# Patient Record
Sex: MALE | Race: AMERICAN INDIAN OR ALASKA NATIVE | NOT HISPANIC OR LATINO | Employment: UNEMPLOYED | URBAN - NONMETROPOLITAN AREA
[De-identification: names, ages, dates, MRNs, and addresses within clinical notes are randomized per-mention and may not be internally consistent; named-entity substitution may affect disease eponyms.]

---

## 2024-09-18 ENCOUNTER — HOSPITAL ENCOUNTER (EMERGENCY)
Facility: OTHER | Age: 39
Discharge: HOME | End: 2024-09-18
Attending: STUDENT IN AN ORGANIZED HEALTH CARE EDUCATION/TRAINING PROGRAM | Admitting: STUDENT IN AN ORGANIZED HEALTH CARE EDUCATION/TRAINING PROGRAM
Payer: COMMERCIAL

## 2024-09-18 ENCOUNTER — APPOINTMENT (OUTPATIENT)
Dept: GENERAL RADIOLOGY | Facility: OTHER | Age: 39
End: 2024-09-18
Attending: STUDENT IN AN ORGANIZED HEALTH CARE EDUCATION/TRAINING PROGRAM
Payer: COMMERCIAL

## 2024-09-18 VITALS
DIASTOLIC BLOOD PRESSURE: 82 MMHG | OXYGEN SATURATION: 99 % | BODY MASS INDEX: 27.92 KG/M2 | RESPIRATION RATE: 16 BRPM | SYSTOLIC BLOOD PRESSURE: 124 MMHG | TEMPERATURE: 98.2 F | HEART RATE: 85 BPM | WEIGHT: 195 LBS | HEIGHT: 70 IN

## 2024-09-18 DIAGNOSIS — L03.116 CELLULITIS OF LEFT ANKLE: ICD-10-CM

## 2024-09-18 DIAGNOSIS — M25.572 ACUTE LEFT ANKLE PAIN: ICD-10-CM

## 2024-09-18 PROCEDURE — 99283 EMERGENCY DEPT VISIT LOW MDM: CPT | Performed by: STUDENT IN AN ORGANIZED HEALTH CARE EDUCATION/TRAINING PROGRAM

## 2024-09-18 PROCEDURE — 96372 THER/PROPH/DIAG INJ SC/IM: CPT | Performed by: STUDENT IN AN ORGANIZED HEALTH CARE EDUCATION/TRAINING PROGRAM

## 2024-09-18 PROCEDURE — 99284 EMERGENCY DEPT VISIT MOD MDM: CPT | Performed by: STUDENT IN AN ORGANIZED HEALTH CARE EDUCATION/TRAINING PROGRAM

## 2024-09-18 PROCEDURE — 250N000011 HC RX IP 250 OP 636: Performed by: STUDENT IN AN ORGANIZED HEALTH CARE EDUCATION/TRAINING PROGRAM

## 2024-09-18 PROCEDURE — 73610 X-RAY EXAM OF ANKLE: CPT | Mod: LT

## 2024-09-18 RX ORDER — CEFTRIAXONE SODIUM 1 G
1 VIAL (EA) INJECTION ONCE
Status: COMPLETED | OUTPATIENT
Start: 2024-09-18 | End: 2024-09-18

## 2024-09-18 RX ORDER — KETOROLAC TROMETHAMINE 30 MG/ML
30 INJECTION, SOLUTION INTRAMUSCULAR; INTRAVENOUS ONCE
Status: COMPLETED | OUTPATIENT
Start: 2024-09-18 | End: 2024-09-18

## 2024-09-18 RX ORDER — CEPHALEXIN 500 MG/1
500 CAPSULE ORAL 4 TIMES DAILY
Qty: 20 CAPSULE | Refills: 0 | Status: SHIPPED | OUTPATIENT
Start: 2024-09-18 | End: 2024-09-23

## 2024-09-18 RX ADMIN — CEFTRIAXONE SODIUM 1 G: 1 INJECTION, POWDER, FOR SOLUTION INTRAMUSCULAR; INTRAVENOUS at 15:31

## 2024-09-18 RX ADMIN — KETOROLAC TROMETHAMINE 30 MG: 30 INJECTION, SOLUTION INTRAMUSCULAR at 15:31

## 2024-09-18 ASSESSMENT — COLUMBIA-SUICIDE SEVERITY RATING SCALE - C-SSRS
6. HAVE YOU EVER DONE ANYTHING, STARTED TO DO ANYTHING, OR PREPARED TO DO ANYTHING TO END YOUR LIFE?: NO
2. HAVE YOU ACTUALLY HAD ANY THOUGHTS OF KILLING YOURSELF IN THE PAST MONTH?: NO
1. IN THE PAST MONTH, HAVE YOU WISHED YOU WERE DEAD OR WISHED YOU COULD GO TO SLEEP AND NOT WAKE UP?: NO

## 2024-09-18 NOTE — ED TRIAGE NOTES
Pt comes in to the ER by law enforcement. He was running through the woods about 4 days ago. Rolled his L ankle going from a hard surface to a liquid surface. Pain is an 8/10, has not had ibuprofen or tylenol today. Ankle is swollen.     Triage Assessment (Adult)       Row Name 09/18/24 1510          Triage Assessment    Airway WDL WDL        Respiratory WDL    Respiratory WDL WDL        Skin Circulation/Temperature WDL    Skin Circulation/Temperature WDL X        Cardiac WDL    Cardiac WDL WDL        Peripheral/Neurovascular WDL    Peripheral Neurovascular WDL WDL        Cognitive/Neuro/Behavioral WDL    Cognitive/Neuro/Behavioral WDL WDL

## 2024-09-18 NOTE — ED PROVIDER NOTES
"  History     Chief Complaint   Patient presents with    Ankle Pain       Chan Souza is a 39 year old male who presents with left ankle pain. Pain started 4 days ago after following his ankle and possibly sustaining an abrasion. Reports fevers since then. 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 Pain located bilateral ankle and feels like anterior medial ankle skin has increased warmth. He has been using ibuprofen for pain. Able to ambulate but with difficulty.    No Known Allergies    There are no problems to display for this patient.      History reviewed. No pertinent past medical history.    History reviewed. No pertinent surgical history.    History reviewed. No pertinent family history.    Social History     Tobacco Use    Smoking status: Every Day     Current packs/day: 1.00     Average packs/day: 1 pack/day for 10.7 years (10.7 ttl pk-yrs)     Types: Cigarettes     Start date: 2014    Smokeless tobacco: Never   Substance Use Topics    Alcohol use: Not Currently    Drug use: Yes     Types: Methamphetamines       Medications:    cephALEXin (KEFLEX) 500 MG capsule      Review of Systems: See HPI for pertinent negatives and positives. All other pertinent systems reviewed and found to be negative.    Physical Exam   /82   Pulse 85   Temp 98.2  F (36.8  C) (Tympanic)   Resp 16   Ht 1.778 m (5' 10\")   Wt 88.5 kg (195 lb)   SpO2 99%   BMI 27.98 kg/m       General: awake, comfortable  HEENT: atraumatic  Respiratory: normal effort  Cardiovascular: left dorsalis pedis and tibialis posterior pulses 2+  Extremities: left ankle with bilateral malleolar tenderness, ROM and strength normal. No fifth metatarsal tenderness, navicular tenderness, medial or lateral malleolar tenderness.   Skin: increased warmth and erythema over anterior medial ankle, scaling of some surrounding skin, dry, skin intact, no fluctuance or crepitus  Neuro: alert, sensation intact throughout " aforementioned foot and ankle  Psych: appropriate mood and affect    ED Course           Results for orders placed or performed during the hospital encounter of 09/18/24 (from the past 24 hour(s))   XR Ankle Port Left G/E 3 Views    Narrative    Exam: XR ANKLE PORT LEFT G/E 3 VIEWS    Technique: Left ankle, 3 Views    Comparison: None    Exam reason: bilateral ankle pain after rolling it 4 days ago    Findings:  No acute fracture or dislocation. Joint spaces are well maintained.   The ankle mortise is intact.    Soft tissue swelling about the ankle.      Impression    Impression:  No acute fracture or dislocation.    AALIYAH BRITT MD         SYSTEM ID:  [id number]       Medications   cefTRIAXone (ROCEPHIN) in NS for IM administration 1 g (1 g Intramuscular $Given 9/18/24 1531)   ketorolac (TORADOL) injection 30 mg (30 mg Intramuscular $Given 9/18/24 1531)       Assessments & Plan (with Medical Decision Making)     I have reviewed nursing notes.    39 year old male evaluated for left ankle pain and redness and increased warmth. Ankle XR with acute bony abnormality. This is soft tissue swelling which could represent ankle sprain. There is an area of skin that appears most consistent with cellulitis over anterior ankle and appears to be associated with recent skin break area. Treating with ankle brace and antibiotics. Discharged back to [facility] with attached instructions on diagnoses provided including ED return precautions.     I have reviewed the findings, diagnosis, plan and need for any follow up with the patient.    Patient instructions:   No broken bone was seen on x-ray. Suspect you sprained your ankle. Use ankle brace for comfort as needed. Perform range of motion exercises daily by writing the ABC's with the big toe 3-4x's per day until healed. You can do activities as tolerated and avoid activity that causes pain. If not improving significantly after 1-2 weeks please follow up with primary care provider,  orthopedics, or ER. On rare occasions there can be a fracture (bone break) that is not seen on initial x-ray or a ligament/tendon tear that may require surgery. Return to ER if pain significantly worsens or you develop numbness or weakness in your foot.    You have an infection of your soft tissue/skin. This is called cellulitis. Please review attached instructions including reasons to return to the emergency department or PCP for re-evaluation. Please complete the entire course of antibiotic. Symptoms may worsen over the next 1-2 days, but if symptoms are not improving after taking this antibiotic for 48 hours - please see a primary care provider or return to the emergency department for evaluation.     Skin redness and swelling can take up to several weeks to fully resolve.    Please review attached instructions including other reasons to return to the emergency department.      New Prescriptions    CEPHALEXIN (KEFLEX) 500 MG CAPSULE    Take 1 capsule (500 mg) by mouth 4 times daily for 5 days.       Final diagnoses:   Acute left ankle pain   Cellulitis of left ankle       9/18/2024   [facility]       Huang Lockett MD  09/18/24 7731

## 2024-09-18 NOTE — DISCHARGE INSTRUCTIONS
No broken bone was seen on x-ray. Suspect you sprained your ankle. Use ankle brace for comfort as needed. Perform range of motion exercises daily by writing the ABC's with the big toe 3-4x's per day until healed. You can do activities as tolerated and avoid activity that causes pain. If not improving significantly after 1-2 weeks please follow up with primary care provider, orthopedics, or ER. On rare occasions there can be a fracture (bone break) that is not seen on initial x-ray or a ligament/tendon tear that may require surgery. Return to ER if pain significantly worsens or you develop numbness or weakness in your foot.    You have an infection of your soft tissue/skin. This is called cellulitis. Please review attached instructions including reasons to return to the emergency department or PCP for re-evaluation. Please complete the entire course of antibiotic. Symptoms may worsen over the next 1-2 days, but if symptoms are not improving after taking this antibiotic for 48 hours - please see a primary care provider or return to the emergency department for evaluation.     Skin redness and swelling can take up to several weeks to fully resolve.    Please review attached instructions including other reasons to return to the emergency department.

## 2024-11-08 ENCOUNTER — OFFICE VISIT (OUTPATIENT)
Dept: FAMILY MEDICINE | Facility: OTHER | Age: 39
End: 2024-11-08
Attending: NURSE PRACTITIONER
Payer: MEDICAID

## 2024-11-08 VITALS
SYSTOLIC BLOOD PRESSURE: 132 MMHG | RESPIRATION RATE: 18 BRPM | DIASTOLIC BLOOD PRESSURE: 74 MMHG | WEIGHT: 214.8 LBS | HEART RATE: 79 BPM | OXYGEN SATURATION: 97 % | TEMPERATURE: 98.1 F | HEIGHT: 70 IN | BODY MASS INDEX: 30.75 KG/M2

## 2024-11-08 DIAGNOSIS — F32.A DEPRESSION, UNSPECIFIED DEPRESSION TYPE: ICD-10-CM

## 2024-11-08 DIAGNOSIS — Z11.3 SCREEN FOR STD (SEXUALLY TRANSMITTED DISEASE): ICD-10-CM

## 2024-11-08 DIAGNOSIS — G47.00 INSOMNIA, UNSPECIFIED TYPE: ICD-10-CM

## 2024-11-08 DIAGNOSIS — Z13.220 SCREENING FOR CHOLESTEROL LEVEL: ICD-10-CM

## 2024-11-08 DIAGNOSIS — F11.21 FENTANYL USE DISORDER, SEVERE, IN EARLY REMISSION (H): ICD-10-CM

## 2024-11-08 DIAGNOSIS — Z23 ENCOUNTER FOR IMMUNIZATION: ICD-10-CM

## 2024-11-08 DIAGNOSIS — Z00.00 ROUTINE GENERAL MEDICAL EXAMINATION AT A HEALTH CARE FACILITY: Primary | ICD-10-CM

## 2024-11-08 DIAGNOSIS — F15.21 METHAMPHETAMINE USE DISORDER, SEVERE, IN EARLY REMISSION (H): ICD-10-CM

## 2024-11-08 LAB
ALBUMIN SERPL BCG-MCNC: 4.6 G/DL (ref 3.5–5.2)
ALP SERPL-CCNC: 82 U/L (ref 40–150)
ALT SERPL W P-5'-P-CCNC: 42 U/L (ref 0–70)
ANION GAP SERPL CALCULATED.3IONS-SCNC: 10 MMOL/L (ref 7–15)
AST SERPL W P-5'-P-CCNC: 25 U/L (ref 0–45)
BASOPHILS # BLD AUTO: 0.1 10E3/UL (ref 0–0.2)
BASOPHILS NFR BLD AUTO: 1 %
BILIRUB SERPL-MCNC: 0.2 MG/DL
BUN SERPL-MCNC: 8.7 MG/DL (ref 6–20)
C TRACH DNA SPEC QL PROBE+SIG AMP: NEGATIVE
CALCIUM SERPL-MCNC: 9.6 MG/DL (ref 8.8–10.4)
CHLORIDE SERPL-SCNC: 104 MMOL/L (ref 98–107)
CHOLEST SERPL-MCNC: 180 MG/DL
CREAT SERPL-MCNC: 0.84 MG/DL (ref 0.67–1.17)
EGFRCR SERPLBLD CKD-EPI 2021: >90 ML/MIN/1.73M2
EOSINOPHIL # BLD AUTO: 0.2 10E3/UL (ref 0–0.7)
EOSINOPHIL NFR BLD AUTO: 2 %
ERYTHROCYTE [DISTWIDTH] IN BLOOD BY AUTOMATED COUNT: 13.8 % (ref 10–15)
FASTING STATUS PATIENT QL REPORTED: NO
FASTING STATUS PATIENT QL REPORTED: NO
GLUCOSE SERPL-MCNC: 104 MG/DL (ref 70–99)
HCO3 SERPL-SCNC: 31 MMOL/L (ref 22–29)
HCT VFR BLD AUTO: 46.3 % (ref 40–53)
HCV AB SERPL QL IA: NONREACTIVE
HDLC SERPL-MCNC: 47 MG/DL
HGB BLD-MCNC: 15 G/DL (ref 13.3–17.7)
HIV 1+2 AB+HIV1 P24 AG SERPL QL IA: NONREACTIVE
IMM GRANULOCYTES # BLD: 0 10E3/UL
IMM GRANULOCYTES NFR BLD: 0 %
LDLC SERPL CALC-MCNC: 104 MG/DL
LYMPHOCYTES # BLD AUTO: 1 10E3/UL (ref 0.8–5.3)
LYMPHOCYTES NFR BLD AUTO: 13 %
MCH RBC QN AUTO: 28.6 PG (ref 26.5–33)
MCHC RBC AUTO-ENTMCNC: 32.4 G/DL (ref 31.5–36.5)
MCV RBC AUTO: 88 FL (ref 78–100)
MONOCYTES # BLD AUTO: 0.4 10E3/UL (ref 0–1.3)
MONOCYTES NFR BLD AUTO: 5 %
N GONORRHOEA DNA SPEC QL NAA+PROBE: NEGATIVE
NEUTROPHILS # BLD AUTO: 6 10E3/UL (ref 1.6–8.3)
NEUTROPHILS NFR BLD AUTO: 78 %
NONHDLC SERPL-MCNC: 133 MG/DL
NRBC # BLD AUTO: 0 10E3/UL
NRBC BLD AUTO-RTO: 0 /100
PLATELET # BLD AUTO: 282 10E3/UL (ref 150–450)
POTASSIUM SERPL-SCNC: 4.4 MMOL/L (ref 3.4–5.3)
PROT SERPL-MCNC: 7.4 G/DL (ref 6.4–8.3)
RBC # BLD AUTO: 5.25 10E6/UL (ref 4.4–5.9)
SODIUM SERPL-SCNC: 145 MMOL/L (ref 135–145)
T PALLIDUM AB SER QL: NONREACTIVE
TRIGL SERPL-MCNC: 146 MG/DL
WBC # BLD AUTO: 7.7 10E3/UL (ref 4–11)

## 2024-11-08 PROCEDURE — 36415 COLL VENOUS BLD VENIPUNCTURE: CPT | Mod: ZL | Performed by: NURSE PRACTITIONER

## 2024-11-08 PROCEDURE — 87491 CHLMYD TRACH DNA AMP PROBE: CPT | Mod: ZL | Performed by: NURSE PRACTITIONER

## 2024-11-08 PROCEDURE — 80053 COMPREHEN METABOLIC PANEL: CPT | Mod: ZL | Performed by: NURSE PRACTITIONER

## 2024-11-08 PROCEDURE — G0463 HOSPITAL OUTPT CLINIC VISIT: HCPCS | Mod: 25

## 2024-11-08 PROCEDURE — 85004 AUTOMATED DIFF WBC COUNT: CPT | Mod: ZL | Performed by: NURSE PRACTITIONER

## 2024-11-08 PROCEDURE — 87389 HIV-1 AG W/HIV-1&-2 AB AG IA: CPT | Mod: ZL | Performed by: NURSE PRACTITIONER

## 2024-11-08 PROCEDURE — 90656 IIV3 VACC NO PRSV 0.5 ML IM: CPT

## 2024-11-08 PROCEDURE — 86803 HEPATITIS C AB TEST: CPT | Mod: ZL | Performed by: NURSE PRACTITIONER

## 2024-11-08 PROCEDURE — 86780 TREPONEMA PALLIDUM: CPT | Mod: ZL | Performed by: NURSE PRACTITIONER

## 2024-11-08 PROCEDURE — 80061 LIPID PANEL: CPT | Mod: ZL | Performed by: NURSE PRACTITIONER

## 2024-11-08 PROCEDURE — 90677 PCV20 VACCINE IM: CPT

## 2024-11-08 RX ORDER — ACETAMINOPHEN 500 MG
1000 TABLET ORAL 4 TIMES DAILY PRN
COMMUNITY
Start: 2024-03-20

## 2024-11-08 RX ORDER — TRAZODONE HYDROCHLORIDE 50 MG/1
100 TABLET, FILM COATED ORAL AT BEDTIME
Qty: 180 TABLET | Refills: 4 | Status: SHIPPED | OUTPATIENT
Start: 2024-11-08

## 2024-11-08 RX ORDER — IBUPROFEN 400 MG/1
400 TABLET, FILM COATED ORAL 4 TIMES DAILY PRN
COMMUNITY
Start: 2024-11-06

## 2024-11-08 RX ORDER — BUPROPION HYDROCHLORIDE 150 MG/1
150 TABLET ORAL EVERY MORNING
Qty: 90 TABLET | Refills: 4 | Status: SHIPPED | OUTPATIENT
Start: 2024-11-08

## 2024-11-08 SDOH — HEALTH STABILITY: PHYSICAL HEALTH: ON AVERAGE, HOW MANY MINUTES DO YOU ENGAGE IN EXERCISE AT THIS LEVEL?: 30 MIN

## 2024-11-08 SDOH — HEALTH STABILITY: PHYSICAL HEALTH: ON AVERAGE, HOW MANY DAYS PER WEEK DO YOU ENGAGE IN MODERATE TO STRENUOUS EXERCISE (LIKE A BRISK WALK)?: 5 DAYS

## 2024-11-08 ASSESSMENT — ANXIETY QUESTIONNAIRES
8. IF YOU CHECKED OFF ANY PROBLEMS, HOW DIFFICULT HAVE THESE MADE IT FOR YOU TO DO YOUR WORK, TAKE CARE OF THINGS AT HOME, OR GET ALONG WITH OTHER PEOPLE?: NOT DIFFICULT AT ALL
GAD7 TOTAL SCORE: 0
1. FEELING NERVOUS, ANXIOUS, OR ON EDGE: NOT AT ALL
5. BEING SO RESTLESS THAT IT IS HARD TO SIT STILL: NOT AT ALL
GAD7 TOTAL SCORE: 0
GAD7 TOTAL SCORE: 0
4. TROUBLE RELAXING: NOT AT ALL
3. WORRYING TOO MUCH ABOUT DIFFERENT THINGS: NOT AT ALL
2. NOT BEING ABLE TO STOP OR CONTROL WORRYING: NOT AT ALL
7. FEELING AFRAID AS IF SOMETHING AWFUL MIGHT HAPPEN: NOT AT ALL
7. FEELING AFRAID AS IF SOMETHING AWFUL MIGHT HAPPEN: NOT AT ALL
IF YOU CHECKED OFF ANY PROBLEMS ON THIS QUESTIONNAIRE, HOW DIFFICULT HAVE THESE PROBLEMS MADE IT FOR YOU TO DO YOUR WORK, TAKE CARE OF THINGS AT HOME, OR GET ALONG WITH OTHER PEOPLE: NOT DIFFICULT AT ALL
6. BECOMING EASILY ANNOYED OR IRRITABLE: NOT AT ALL

## 2024-11-08 ASSESSMENT — SOCIAL DETERMINANTS OF HEALTH (SDOH): HOW OFTEN DO YOU GET TOGETHER WITH FRIENDS OR RELATIVES?: MORE THAN THREE TIMES A WEEK

## 2024-11-08 ASSESSMENT — PAIN SCALES - GENERAL: PAINLEVEL_OUTOF10: NO PAIN (0)

## 2024-11-08 NOTE — NURSING NOTE
"Chief Complaint   Patient presents with    Physical     For [facility]        Initial /74   Pulse 79   Temp 98.1  F (36.7  C) (Tympanic)   Resp 18   Ht 1.778 m (5' 10\")   Wt 97.4 kg (214 lb 12.8 oz)   SpO2 97%   BMI 30.82 kg/m   Estimated body mass index is 30.82 kg/m  as calculated from the following:    Height as of this encounter: 1.778 m (5' 10\").    Weight as of this encounter: 97.4 kg (214 lb 12.8 oz).  Medication Review: complete    The next two questions are to help us understand your food security.  If you are feeling you need any assistance in this area, we have resources available to support you today.          11/8/2024   SDOH- Food Insecurity   Within the past 12 months, did you worry that your food would run out before you got money to buy more? N    Within the past 12 months, did the food you bought just not last and you didn t have money to get more? N        Patient-reported         Health Care Directive:  Patient does not have a Health Care Directive: Discussed advance care planning with patient; however, patient declined at this time.    Soraya Tejada CMA    Immunization Documentation    Prior to Immunization administration, verified patients identity using patient's name and date of birth. Please see IMMUNIZATIONS  and order for additional information.  Patient / Parent instructed to remain in clinic for 15 minutes and report any adverse reaction to staff immediately.    Was the entire amount of vaccines given used? Yes    Soraya Tejada CMA  11/8/2024   11:23 AM        "

## 2024-11-08 NOTE — PATIENT INSTRUCTIONS
I recommend establishing care in [place] (for your convenience purposes) I recommend Carri Taylor NP as I am quite sure she does suboxone therapy although I cannot guarantee that this is something she will prescribe when you meet her or not. I have placed orders for wellbutrin and trazodone to resume these medications; I have sent a 1 year supply to pharmacy but we require annual physicals to get these medications prescribed regularly.     Updated flu and pneumonia shots today. You declined covid shot.     Labs in process including std screening. Will be in touch with results.     Patient Education   Preventive Care Advice   This is general advice given by our system to help you stay healthy. However, your care team may have specific advice just for you. Please talk to your care team about your preventive care needs.  Nutrition  Eat 5 or more servings of fruits and vegetables each day.  Try wheat bread, brown rice and whole grain pasta (instead of white bread, rice, and pasta).  Get enough calcium and vitamin D. Check the label on foods and aim for 100% of the RDA (recommended daily allowance).  Lifestyle  Exercise at least 150 minutes each week  (30 minutes a day, 5 days a week).  Do muscle strengthening activities 2 days a week. These help control your weight and prevent disease.  No smoking.  Wear sunscreen to prevent skin cancer.  Have a dental exam and cleaning every 6 months.  Yearly exams  See your health care team every year to talk about:  Any changes in your health.  Any medicines your care team has prescribed.  Preventive care, family planning, and ways to prevent chronic diseases.  Shots (vaccines)   HPV shots (up to age 26), if you've never had them before.  Hepatitis B shots (up to age 59), if you've never had them before.  COVID-19 shot: Get this shot when it's due.  Flu shot: Get a flu shot every year.  Tetanus shot: Get a tetanus shot every 10 years.  Pneumococcal, hepatitis A, and RSV  shots: Ask your care team if you need these based on your risk.  Shingles shot (for age 50 and up)  General health tests  Diabetes screening:  Starting at age 35, Get screened for diabetes at least every 3 years.  If you are younger than age 35, ask your care team if you should be screened for diabetes.  Cholesterol test: At age 39, start having a cholesterol test every 5 years, or more often if advised.  Bone density scan (DEXA): At age 50, ask your care team if you should have this scan for osteoporosis (brittle bones).  Hepatitis C: Get tested at least once in your life.  STIs (sexually transmitted infections)  Before age 24: Ask your care team if you should be screened for STIs.  After age 24: Get screened for STIs if you're at risk. You are at risk for STIs (including HIV) if:  You are sexually active with more than one person.  You don't use condoms every time.  You or a partner was diagnosed with a sexually transmitted infection.  If you are at risk for HIV, ask about PrEP medicine to prevent HIV.  Get tested for HIV at least once in your life, whether you are at risk for HIV or not.  Cancer screening tests  Cervical cancer screening: If you have a cervix, begin getting regular cervical cancer screening tests starting at age 21.  Breast cancer scan (mammogram): If you've ever had breasts, begin having regular mammograms starting at age 40. This is a scan to check for breast cancer.  Colon cancer screening: It is important to start screening for colon cancer at age 45.  Have a colonoscopy test every 10 years (or more often if you're at risk) Or, ask your provider about stool tests like a FIT test every year or Cologuard test every 3 years.  To learn more about your testing options, visit:   .  For help making a decision, visit:   https://bit.ly/hi57650.  Prostate cancer screening test: If you have a prostate, ask your care team if a prostate cancer screening test (PSA) at age 55 is right for you.  Lung cancer  screening: If you are a current or former smoker ages 50 to 80, ask your care team if ongoing lung cancer screenings are right for you.  For informational purposes only. Not to replace the advice of your health care provider. Copyright   2023 [facility]. All rights reserved. Clinically reviewed by the [facility] Transitions Program. [employer] [id number] - REV 01/24.

## 2024-11-08 NOTE — PROGRESS NOTES
Preventive Care Visit  [facility]  Soraya Freeman NP, Family Medicine  Nov 8, 2024      Assessment & Plan   Problem List Items Addressed This Visit    None  Visit Diagnoses       Routine general medical examination at a health care facility    -  Primary    Relevant Orders    CBC and Differential (Completed)    Comprehensive Metabolic Panel (Completed)    CBC and Differential    Screen for STD (sexually transmitted disease)        Relevant Orders    GC/Chlamydia by PCR (Completed)    HIV Antigen Antibody Combo (Completed)    Hepatitis C Screen Reflex to HCV RNA Quant and Genotype (Completed)    Treponema Ab w Reflex to RPR and Titer (Completed)    Screening for cholesterol level        Relevant Orders    Lipid Panel (Completed)    Encounter for immunization        Relevant Orders    PNEUMOCOCCAL 20 VALENT CONJUGATE (PREVNAR 20) (Completed)    INFLUENZA VACCINE, SPLIT VIRUS, TRIVALENT,PF (FLUZONE\FLUARIX) (Completed)    Insomnia, unspecified type        Relevant Medications    traZODone (DESYREL) 50 MG tablet    Depression, unspecified depression type        Relevant Medications    buPROPion (WELLBUTRIN XL) 150 MG 24 hr tablet    traZODone (DESYREL) 50 MG tablet    Methamphetamine use disorder, severe, in early remission (H)        Fentanyl use disorder, severe, in early remission (H)                 Patient has been advised of split billing requirements and indicates understanding: Yes    1. Routine general medical examination at a health care facility (Primary)  - CBC and Differential  - Comprehensive Metabolic Panel  Normal blood counts, renal function, electrolytes     2. Screen for STD (sexually transmitted disease)  - GC/Chlamydia by PCR  - HIV Antigen Antibody Combo  - Hepatitis C Screen Reflex to HCV RNA Quant and Genotype  - Treponema Ab w Reflex to RPR and Titer  Negative results for the above; no indication for treatment. Recommend safe sex practices.     3. Screening for  cholesterol level  - Lipid Panel  Mild elevation of LDL; total cholesterol within normal limits. Recommend diet/lifestyle changes.     4. Encounter for immunization  - PNEUMOCOCCAL 20 VALENT CONJUGATE (PREVNAR 20)  - INFLUENZA VACCINE, SPLIT VIRUS, TRIVALENT,PF (FLUZONE\FLUARIX)    5. Insomnia, unspecified type  - traZODone (DESYREL) 50 MG tablet; Take 2 tablets (100 mg) by mouth at bedtime.  Dispense: 180 tablet; Refill: 4  I was unable to find this on record but patient stated he has done well on it in the past, this summer when he was at treatment in [place], [place]. I feel it is appropriate to prescribe, I recommend annual physicals with a PCP to get additional refills beyond 1 year.     6. Depression, unspecified depression type  - buPROPion (WELLBUTRIN XL) 150 MG 24 hr tablet; Take 1 tablet (150 mg) by mouth every morning.  Dispense: 90 tablet; Refill: 4  In records; he has been historically prescribed and done well. Will fill for 1 year and recommend establish care with PCP, likely in [place] at [facility] as he resides in [place] and states this would be more convenient.     7. Methamphetamine use disorder, severe, in early remission (H)  8. Fentanyl use disorder, severe, in early remission (H)  Currently in treatment; long standing history of intermittent issues with methamphetamines. Has been treated with suboxone in the past; and he would like to get back on this. I do not prescribe it but patient willing to establish care with provider who does at [facility] to discuss potential for this.        Nicotine/Tobacco Cessation  He reports that he has been smoking cigarettes. He started smoking about 10 years ago. He has a 10.9 pack-year smoking history. He has never used smokeless tobacco.  Nicotine/Tobacco Cessation Plan  Information offered: Patient not interested at this time      BMI  Estimated body mass index is 30.82 kg/m  as calculated from the following:    Height as of this  "encounter: 1.778 m (5' 10\").    Weight as of this encounter: 97.4 kg (214 lb 12.8 oz).   Weight management plan: Discussed healthy diet and exercise guidelines    Counseling  Appropriate preventive services were addressed with this patient via screening, questionnaire, or discussion as appropriate for fall prevention, nutrition, physical activity, Tobacco-use cessation, social engagement, weight loss and cognition.  Checklist reviewing preventive services available has been given to the patient.  Reviewed patient's diet, addressing concerns and/or questions.   The patient was instructed to see the dentist every 6 months.   He is at risk for psychosocial distress and has been provided with information to reduce risk.         Return in about 53 weeks (around 11/14/2025) for Annual Wellness Visit.      Dinh Giles is a 39 year old, presenting for the following:  Physical (For [facility] )        11/8/2024    10:23 AM   Additional Questions   Roomed by FREDERICK Lira   Accompanied by Self         11/8/2024   Forms   Any forms needing to be completed Yes             DORIS Giles presents here today for a physical as advised by [facility] where he is currently staying.  He is there for treatment for methamphetamine abuse and had also tried fentanyl in the past.  He is there somewhat willingly but also it was court ordered.  He tells me that he has a home with his girlfriend and their children in [place].  He plans to go back there following this stay and treatment.  He reports that he has historically done well on Suboxone treatment and he was hoping he could get back on it.  He has been off of it for a month or 2.  He tells me he thinks he stopped taking it cold turkey around September 5.  At that time he also stopped taking his Wellbutrin and his trazodone which he had previously been prescribed.  And that is when he began using street drugs again.  However, it sounds as though he has been intermittently " "using over the years and there is some discrepencies as to the time periods.  He had done a different stent of treatment in [place], [place] around July 2024.  He tells me that he has had some relationship problems and at one point his girlfriend had \"taken his phone and sent herself text messages which she then sent via [employer] to his Suboxone prescriber.\" Chan states he was not using drugs at that time, but the staged text messages made it sound as though he was using and selling his prescriptions.  Due to this, there were issues for him getting his medications filled and then he started using again.  He is working through that with his significant other.  He denies any physical health concerns at this time.     He has never had a colonoscopy.  He denies any abdominal pain.  He is not ready to quit smoking.  He denies having a COVID shot today but is okay with receiving a flu and pneumonia shot.  He is also hopeful he can get back on Wellbutrin and trazodone.  He tells me that his mood has been down and depressed. No suicidal ideations or attempts.  He has a lot of insomnia issues and trazodone was helpful in the past.  This was prescribed in the summer when he was at treatment in [place], [place].  He is from the [facility] and generally doctors with [facility].  He was previously being prescribed Suboxone by a provider at [facility] clinic in [place], [place].  He tells me be much more convenient for him to  In [place], [place].      Health Care Directive  Patient does not have a Health Care Directive: Discussed advance care planning with patient; however, patient declined at this time.      11/8/2024   General Health   How would you rate your overall physical health? Good   Feel stress (tense, anxious, or unable to sleep) Only a little      (!) STRESS CONCERN      11/8/2024   Nutrition   Three or more servings of calcium each day? Yes   Diet: Regular (no restrictions)   How many " servings of fruit and vegetables per day? (!) 0-1   How many sweetened beverages each day? (!) 3            11/8/2024   Exercise   Days per week of moderate/strenous exercise 5 days   Average minutes spent exercising at this level 30 min            11/8/2024   Social Factors   Frequency of gathering with friends or relatives More than three times a week   Worry food won't last until get money to buy more No   Food not last or not have enough money for food? No   Do you have housing? (Housing is defined as stable permanent housing and does not include staying ouside in a car, in a tent, in an abandoned building, in an overnight shelter, or couch-surfing.) Yes   Are you worried about losing your housing? No   Lack of transportation? No   Unable to get utilities (heat,electricity)? No            11/8/2024   Dental   Dentist two times every year? (!) NO            11/8/2024   TB Screening   Were you born outside of the [place]? No            Today's PHQ-2 Score:       11/8/2024    10:10 AM   PHQ-2 ( 1999 [employer])   Q1: Little interest or pleasure in doing things 0    Q2: Feeling down, depressed or hopeless 0    PHQ-2 Score 0    Q1: Little interest or pleasure in doing things Not at all   Q2: Feeling down, depressed or hopeless Not at all   PHQ-2 Score 0       Patient-reported           11/8/2024   Substance Use   If I could quit smoking, I would Somewhat agree   I want to quit somking, worry about health affects Somewhat agree   Willing to make a plan to quit smoking Somewhat agree   Willing to cut down before quitting Somewhat agree   Alcohol more than 3/day or more than 7/wk Not Applicable   Do you use any other substances recreationally? No        Social History     Tobacco Use    Smoking status: Every Day     Current packs/day: 1.00     Average packs/day: 1 pack/day for 10.9 years (10.9 ttl pk-yrs)     Types: Cigarettes     Start date: 2014    Smokeless tobacco: Never   Vaping Use    Vaping status: Never Used   Substance  "Use Topics    Alcohol use: Not Currently     Comment: Currently in treatment    Drug use: Not Currently     Types: Methamphetamines, Fentanyl     Comment: Currently in treatment             11/8/2024   One time HIV Screening   Previous HIV test? No          11/8/2024   STI Screening   New sexual partner(s) since last STI/HIV test? No            11/8/2024   Contraception/Family Planning   Questions about contraception or family planning No           Reviewed and updated as needed this visit by Provider       Med Hx              Review of Systems  Constitutional, HEENT, cardiovascular, pulmonary, GI, , musculoskeletal, neuro, skin, endocrine and psych systems are negative, except as otherwise noted.     Objective    Exam  /74   Pulse 79   Temp 98.1  F (36.7  C) (Tympanic)   Resp 18   Ht 1.778 m (5' 10\")   Wt 97.4 kg (214 lb 12.8 oz)   SpO2 97%   BMI 30.82 kg/m     Estimated body mass index is 30.82 kg/m  as calculated from the following:    Height as of this encounter: 1.778 m (5' 10\").    Weight as of this encounter: 97.4 kg (214 lb 12.8 oz).    Physical Exam  Vitals and nursing note reviewed.   Constitutional:       General: He is not in acute distress.     Appearance: Normal appearance. He is not ill-appearing or toxic-appearing.   HENT:      Head: Normocephalic and atraumatic.      Right Ear: Tympanic membrane, ear canal and external ear normal.      Left Ear: Tympanic membrane, ear canal and external ear normal.      Nose: Nose normal.      Mouth/Throat:      Mouth: Mucous membranes are moist.      Pharynx: No oropharyngeal exudate or posterior oropharyngeal erythema.   Eyes:      Extraocular Movements: Extraocular movements intact.      Pupils: Pupils are equal, round, and reactive to light.   Cardiovascular:      Rate and Rhythm: Normal rate and regular rhythm.      Heart sounds: Normal heart sounds. No murmur heard.  Pulmonary:      Effort: Pulmonary effort is normal. No respiratory distress. "      Breath sounds: Normal breath sounds. No wheezing.   Abdominal:      General: Abdomen is flat. Bowel sounds are normal. There is no distension.      Palpations: Abdomen is soft. There is no mass.      Tenderness: There is no abdominal tenderness. There is no right CVA tenderness, left CVA tenderness, guarding or rebound.      Hernia: No hernia is present.   Genitourinary:     Comments: Deferred   Musculoskeletal:         General: Normal range of motion.      Cervical back: Normal range of motion and neck supple. No rigidity or tenderness.      Right lower leg: No edema.      Left lower leg: No edema.   Lymphadenopathy:      Cervical: No cervical adenopathy.   Skin:     General: Skin is warm and dry.   Neurological:      General: No focal deficit present.      Mental Status: He is alert and oriented to person, place, and time.   Psychiatric:         Mood and Affect: Mood normal.         Behavior: Behavior normal.         Results for orders placed or performed in visit on 11/08/24   HIV Antigen Antibody Combo     Status: Normal   Result Value Ref Range    HIV Antigen Antibody Combo Nonreactive Nonreactive   Hepatitis C Screen Reflex to HCV RNA Quant and Genotype     Status: Normal   Result Value Ref Range    Hepatitis C Antibody Nonreactive Nonreactive   Treponema Ab w Reflex to RPR and Titer     Status: Normal   Result Value Ref Range    Treponema Antibody Total Nonreactive Nonreactive   Comprehensive Metabolic Panel     Status: Abnormal   Result Value Ref Range    Sodium 145 135 - 145 mmol/L    Potassium 4.4 3.4 - 5.3 mmol/L    Carbon Dioxide (CO2) 31 (H) 22 - 29 mmol/L    Anion Gap 10 7 - 15 mmol/L    Urea Nitrogen 8.7 6.0 - 20.0 mg/dL    Creatinine 0.84 0.67 - 1.17 mg/dL    GFR Estimate >90 >60 mL/min/1.73m2    Calcium 9.6 8.8 - 10.4 mg/dL    Chloride 104 98 - 107 mmol/L    Glucose 104 (H) 70 - 99 mg/dL    Alkaline Phosphatase 82 40 - 150 U/L    AST 25 0 - 45 U/L    ALT 42 0 - 70 U/L    Protein Total 7.4 6.4 -  8.3 g/dL    Albumin 4.6 3.5 - 5.2 g/dL    Bilirubin Total 0.2 <=1.2 mg/dL    Patient Fasting > 8hrs? No    Lipid Panel     Status: Abnormal   Result Value Ref Range    Cholesterol 180 <200 mg/dL    Triglycerides 146 <150 mg/dL    Direct Measure HDL 47 >=40 mg/dL    LDL Cholesterol Calculated 104 (H) <100 mg/dL    Non HDL Cholesterol 133 (H) <130 mg/dL    Patient Fasting > 8hrs? No     Narrative    Cholesterol  Desirable: < 200 mg/dL  Borderline High: 200 - 239 mg/dL  High: >= 240 mg/dL    Triglycerides  Normal: < 150 mg/dL  Borderline High: 150 - 199 mg/dL  High: 200-499 mg/dL  Very High: >= 500 mg/dL    Direct Measure HDL  Female: >= 50 mg/dL   Male: >= 40 mg/dL    LDL Cholesterol  Desirable: < 100 mg/dL  Above Desirable: 100 - 129 mg/dL   Borderline High: 130 - 159 mg/dL   High:  160 - 189 mg/dL   Very High: >= 190 mg/dL    Non HDL Cholesterol  Desirable: < 130 mg/dL  Above Desirable: 130 - 159 mg/dL  Borderline High: 160 - 189 mg/dL  High: 190 - 219 mg/dL  Very High: >= 220 mg/dL   CBC with platelets and differential     Status: None   Result Value Ref Range    WBC Count 7.7 4.0 - 11.0 10e3/uL    RBC Count 5.25 4.40 - 5.90 10e6/uL    Hemoglobin 15.0 13.3 - 17.7 g/dL    Hematocrit 46.3 40.0 - 53.0 %    MCV 88 78 - 100 fL    MCH 28.6 26.5 - 33.0 pg    MCHC 32.4 31.5 - 36.5 g/dL    RDW 13.8 10.0 - 15.0 %    Platelet Count 282 150 - 450 10e3/uL    % Neutrophils 78 %    % Lymphocytes 13 %    % Monocytes 5 %    % Eosinophils 2 %    % Basophils 1 %    % Immature Granulocytes 0 %    NRBCs per 100 WBC 0 <1 /100    Absolute Neutrophils 6.0 1.6 - 8.3 10e3/uL    Absolute Lymphocytes 1.0 0.8 - 5.3 10e3/uL    Absolute Monocytes 0.4 0.0 - 1.3 10e3/uL    Absolute Eosinophils 0.2 0.0 - 0.7 10e3/uL    Absolute Basophils 0.1 0.0 - 0.2 10e3/uL    Absolute Immature Granulocytes 0.0 <=0.4 10e3/uL    Absolute NRBCs 0.0 10e3/uL   GC/Chlamydia by PCR     Status: Normal    Specimen: Urine, Voided   Result Value Ref Range    Chlamydia  Trachomatis Negative Negative    Neisseria gonorrhoeae Negative Negative    Narrative    Assay performed using [employer] real-time, reverse-transcriptase PCR.   CBC and Differential     Status: None    Narrative    The following orders were created for panel order CBC and Differential.  Procedure                               Abnormality         Status                     ---------                               -----------         ------                     CBC with platelets and d...[[id number]]                      Final result                 Please view results for these tests on the individual orders.         Signed Electronically by: Soraya Freeman NP    Answers submitted by the patient for this visit:  Patient Health Questionnaire (G7) (Submitted on 11/8/2024)  DURAN 7 TOTAL SCORE: 0

## 2024-11-11 ENCOUNTER — TELEPHONE (OUTPATIENT)
Dept: FAMILY MEDICINE | Facility: OTHER | Age: 39
End: 2024-11-11
Payer: MEDICAID

## 2024-11-11 NOTE — TELEPHONE ENCOUNTER
"Message from pharmacy, regarding:  traZODone (DESYREL) 50 MG tablet 180 tablet 4 11/8/2024 -- No   Sig - Route: Take 2 tablets (100 mg) by mouth at bedtime. - Oral     \"Should this be 2 tabs HS or 1-2 tabs HS?\"    Noemy Mike RN .............. 11/11/2024  9:52 AM    "

## 2024-11-11 NOTE — TELEPHONE ENCOUNTER
"1-2 tablets 1 hour before bedtime. Start with 1 tablet; if that works, then do not need to take 2 tablets. I can update the script to say \"1 tablet\" at HS if they need it to be more specific. Let me know, thanks.     Soraya Freeman NP on 11/11/2024 at 10:00 AM    "

## 2024-11-11 NOTE — TELEPHONE ENCOUNTER
Called [facility] and spoke with pharmacist Nallely, after verifying Pt's last name and . She was informed of provider response. New order not required. Noemy Mike RN .............. 2024  11:14 AM

## 2025-01-22 DIAGNOSIS — F32.A DEPRESSION, UNSPECIFIED DEPRESSION TYPE: ICD-10-CM

## 2025-01-22 RX ORDER — BUPROPION HYDROCHLORIDE 150 MG/1
TABLET ORAL
Qty: 14 TABLET | Refills: 0 | OUTPATIENT
Start: 2025-01-22

## 2025-01-22 NOTE — TELEPHONE ENCOUNTER
[id number] sent Rx request for the following:      Requested Prescriptions   Pending Prescriptions Disp Refills    buPROPion (WELLBUTRIN XL) 150 MG 24 hr tablet [Pharmacy Med Name: BUPROPION 150MG ER (XL) TABLET] 14 tablet 0     Sig: TAKE 1 TABLET BY MOUTH ONE TIME A DAY FOR 14 DAYS. DO NOT CRUSH.       Rx Protocol Bupropion Failed - 1/22/2025  4:36 PM        Failed - PHQ-9 score of less than 5 in past 6 months     Please review last PHQ-9 score.        Last Prescription Date:     Last Fill Qty/Refills:         , R-    Last Office Visit:                 Future Office visit:                buPROPion (WELLBUTRIN XL) 150 MG 24 hr tablet 90 tablet 4 11/8/2024 -- No   Sig - Route: Take 1 tablet (150 mg) by mouth every morning. - Oral   Sent to pharmacy as: buPROPion HCl ER (XL) 150 MG Oral Tablet Extended Release 24 Hour (WELLBUTRIN XL)   Class: E-Prescribe   Order: [id number]   E-Prescribing Status: Receipt confirmed by pharmacy (11/8/2024 11:03 AM CST)     Called and spoke to Charlie at [facility]. Patient discharged on 11/25/24. Unable to contact patient as phone number has been disconnected.     Unable to complete prescription refill per RN Medication Refill Policy.     Pharmacy notified.    Leon Lafleur RN on 1/22/2025 at 4:57 PM

## 2025-01-23 ENCOUNTER — TELEPHONE (OUTPATIENT)
Dept: FAMILY MEDICINE | Facility: OTHER | Age: 40
End: 2025-01-23
Payer: MEDICAID

## 2025-01-23 NOTE — TELEPHONE ENCOUNTER
Reason for call: Medication or medication refill    Have you contacted your pharmacy regarding this refill? Yes      If No, direct the patient to contact the Pharmacy and discontinue telephone note using Erroneous Encounter.  If Yes, continue.    Name of medication requested: wellbutrin     How many days of medication do you have left? None     What pharmacy do you use? [facility] in [place]    Preferred method for responding to this message: Telephone Call    Phone number patient can be reached at: Cell number on file:    Telephone Information:   Mobile [phone]       If we cannot reach you directly, may we leave a detailed response at the number you provided? Yes

## 2025-01-23 NOTE — TELEPHONE ENCOUNTER
Patient informed of below. Understanding voiced.     Soraya Tejada, FREDERICK on 1/23/2025 at 2:28 PM

## 2025-01-23 NOTE — TELEPHONE ENCOUNTER
Phone number [phone] is not in service. Attempted to contact patient on mobile phone. Left message to call back.     Bupropion was last filled 11/8/24 for 90 tablets with 4 refills. He will need to have his script at [facility] in [place] transferred. He will need to contact pharmacy where he would like scripts filled at and have them request transfer from [facility] in [place]. Otherwise they may be able to see the script on file if both [facility] pharmacies use the same computer system.     Soraya Tejada CMA on 1/23/2025 at 1:25 PM  Ext [phone]

## 2025-02-04 DIAGNOSIS — F32.A DEPRESSION, UNSPECIFIED DEPRESSION TYPE: ICD-10-CM

## 2025-02-05 RX ORDER — BUPROPION HYDROCHLORIDE 150 MG/1
150 TABLET ORAL EVERY MORNING
Qty: 90 TABLET | Refills: 4 | OUTPATIENT
Start: 2025-02-05

## 2025-02-05 NOTE — TELEPHONE ENCOUNTER
[facility] sent Rx request for the following:      Requested Prescriptions   Pending Prescriptions Disp Refills    buPROPion (WELLBUTRIN XL) 150 MG 24 hr tablet 90 tablet 4     Sig: Take 1 tablet (150 mg) by mouth every morning.       Rx Protocol Bupropion Failed - 2/5/2025 11:12 AM        Failed - PHQ-9 score of less than 5 in past 6 months     Please review last PHQ-9 score.          Last Prescription Date:   11/8/24  Last Fill Qty/Refills:         90, R-4    Last Office Visit:              11/8/24   Future Office visit:           none    Patient needs to contact pharmacy to transfer, see telephone note 1/23/25. Message sent to pharmacy.    Cait Rice RN on 2/5/2025 at 11:14 AM

## (undated) RX ORDER — CEFTRIAXONE SODIUM 1 G
VIAL (EA) INJECTION
Status: DISPENSED
Start: 2024-09-18

## (undated) RX ORDER — LIDOCAINE HYDROCHLORIDE 10 MG/ML
INJECTION, SOLUTION EPIDURAL; INFILTRATION; INTRACAUDAL; PERINEURAL
Status: DISPENSED
Start: 2024-09-18

## (undated) RX ORDER — KETOROLAC TROMETHAMINE 30 MG/ML
INJECTION, SOLUTION INTRAMUSCULAR; INTRAVENOUS
Status: DISPENSED
Start: 2024-09-18